# Patient Record
Sex: FEMALE | Race: WHITE | URBAN - METROPOLITAN AREA
[De-identification: names, ages, dates, MRNs, and addresses within clinical notes are randomized per-mention and may not be internally consistent; named-entity substitution may affect disease eponyms.]

---

## 2020-05-13 ENCOUNTER — APPOINTMENT (OUTPATIENT)
Dept: URBAN - METROPOLITAN AREA SURGERY 18 | Age: 75
Setting detail: DERMATOLOGY
End: 2020-05-13

## 2020-05-13 VITALS — SYSTOLIC BLOOD PRESSURE: 160 MMHG | DIASTOLIC BLOOD PRESSURE: 80 MMHG | RESPIRATION RATE: 16 BRPM | HEART RATE: 64 BPM

## 2020-05-13 PROBLEM — C44.729 SQUAMOUS CELL CARCINOMA OF SKIN OF LEFT LOWER LIMB, INCLUDING HIP: Status: ACTIVE | Noted: 2020-05-13

## 2020-05-13 PROCEDURE — 13121 CMPLX RPR S/A/L 2.6-7.5 CM: CPT

## 2020-05-13 PROCEDURE — OTHER REFERRAL CORRESPONDENCE: OTHER

## 2020-05-13 PROCEDURE — OTHER MOHS SURGERY: OTHER

## 2020-05-13 PROCEDURE — 17313 MOHS 1 STAGE T/A/L: CPT

## 2020-05-13 PROCEDURE — OTHER COUNSELING: OTHER

## 2020-05-15 ENCOUNTER — APPOINTMENT (OUTPATIENT)
Dept: URBAN - METROPOLITAN AREA SURGERY 18 | Age: 75
Setting detail: DERMATOLOGY
End: 2020-05-15

## 2020-05-15 VITALS — DIASTOLIC BLOOD PRESSURE: 85 MMHG | HEART RATE: 66 BPM | SYSTOLIC BLOOD PRESSURE: 157 MMHG

## 2020-05-15 PROBLEM — C44.722 SQUAMOUS CELL CARCINOMA OF SKIN OF RIGHT LOWER LIMB, INCLUDING HIP: Status: ACTIVE | Noted: 2020-05-15

## 2020-05-15 PROCEDURE — OTHER MOHS SURGERY WITH PRE-OP FROZEN SECTION BIOPSY: OTHER

## 2020-05-15 PROCEDURE — 17313 MOHS 1 STAGE T/A/L: CPT | Mod: 79

## 2020-05-15 PROCEDURE — 11102 TANGNTL BX SKIN SINGLE LES: CPT | Mod: 59,79

## 2020-05-15 PROCEDURE — 13121 CMPLX RPR S/A/L 2.6-7.5 CM: CPT | Mod: 59,79

## 2020-05-15 PROCEDURE — OTHER MOHS SURGERY WITH PRE-OP FROZEN SECTION SHAVE BIOPSY: OTHER

## 2020-05-15 PROCEDURE — OTHER COUNSELING: OTHER

## 2020-05-15 PROCEDURE — OTHER REFERRAL CORRESPONDENCE: OTHER

## 2020-05-15 PROCEDURE — 88331 PATH CONSLTJ SURG 1 BLK 1SPC: CPT | Mod: 59

## 2020-05-15 NOTE — PROCEDURE: MOHS SURGERY WITH PRE-OP FROZEN SECTION BIOPSY
Applied Partial Purse String (Simple) Text: Given the location of the defect and the characteristics of the surrounding skin a simple purse string closure was deemed most appropriate.  Undermining was performed circumfirentially around the surgical defect.  A purse string suture was then placed and tightened. Wound tension only allowed a partial closure of the circular defect.

## 2020-05-15 NOTE — PROCEDURE: MOHS SURGERY WITH PRE-OP FROZEN SECTION SHAVE BIOPSY
Bilobed Flap Text: Given the location of the defect, inherent tension at the surgical site, and the proximity to free margins a bilobe transposition flap was deemed most appropriate for wound reconstruction. The risks, benefits, and possible outcomes of this procedure were discussed along with the risks, benefits, and possible outcomes of other options for wound repair (including but not limited to: complex closure, other flaps, grafts, and second intention). The patient verbalized understanding and consent to the outlined procedure. The patient verbalized understanding that intraoperative conditions may necessitate a change in the outlined procedure resulting in modification of the original surgical plan. This decision may be made at the discretion of the surgeon, and is due to factors subject to change or that are difficult to predict preoperatively. We also discussed that should the patient heal with a scar they find noticeable/unfavorable additional surgical procedures and/or referral to additional specialists may be required. The patient understands the healing process takes place over many months and that the \"final\" appearance of the scar line often takes a year or longer to achieve.\\nUsing a sterile surgical marker, an appropriate bilobe transposition flap was drawn incorporating the defect and placing the expected incisions within the relaxed skin tension lines where possible. The surgical site and surrounding skin were prepped and draped in sterile fashion.  A single standing cone was removed at the expected pivot point of the transposition flap in the appropriate surgical plane, repositioning as necessary based upon local tension, proximity to free margins/other anatomic structures, and position of the relaxed skin tension lines. The flap (containing two lobes  by 45 degrees, each proportionate in size to the postop defect) was incised and raised in the appropriate surgical plane (just below the level of the nasalis musculature). The resulting defect was undermined widely and bilaterally in the appropriate surgical plane taking care to preserve local arteries, veins, nerves, and other structures of anatomic importance. This created a flap capable of transposing across the defect to close the wound under minimal tension, without distortion of adjacent free margins. Hemostasis was achieved and then the wound was sutured in layered fashion.

## 2020-12-02 ENCOUNTER — APPOINTMENT (OUTPATIENT)
Dept: URBAN - METROPOLITAN AREA SURGERY 18 | Age: 75
Setting detail: DERMATOLOGY
End: 2020-12-03

## 2020-12-02 PROBLEM — C44.729 SQUAMOUS CELL CARCINOMA OF SKIN OF LEFT LOWER LIMB, INCLUDING HIP: Status: ACTIVE | Noted: 2020-12-02

## 2020-12-02 PROCEDURE — OTHER COUNSELING: OTHER

## 2020-12-02 PROCEDURE — 17314 MOHS ADDL STAGE T/A/L: CPT

## 2020-12-02 PROCEDURE — 13121 CMPLX RPR S/A/L 2.6-7.5 CM: CPT

## 2020-12-02 PROCEDURE — OTHER REFERRAL CORRESPONDENCE: OTHER

## 2020-12-02 PROCEDURE — 17313 MOHS 1 STAGE T/A/L: CPT

## 2020-12-02 PROCEDURE — OTHER MOHS SURGERY: OTHER

## 2020-12-02 NOTE — PROCEDURE: MOHS SURGERY
none Referred To Plastics For Closure Text (Leave Blank If You Do Not Want): After obtaining clear surgical margins the patient was sent to plastics for surgical repair. The patient understands they will receive post-surgical care and follow-up from the plastic surgeon's and referring physician's office, and may follow up in our surgical clinic as needed.

## 2020-12-10 ENCOUNTER — APPOINTMENT (OUTPATIENT)
Dept: URBAN - METROPOLITAN AREA SURGERY 18 | Age: 75
Setting detail: DERMATOLOGY
End: 2020-12-10

## 2020-12-10 VITALS — SYSTOLIC BLOOD PRESSURE: 161 MMHG | HEART RATE: 66 BPM | TEMPERATURE: 97.3 F | DIASTOLIC BLOOD PRESSURE: 80 MMHG

## 2020-12-10 DIAGNOSIS — Z48.817 ENCOUNTER FOR SURGICAL AFTERCARE FOLLOWING SURGERY ON THE SKIN AND SUBCUTANEOUS TISSUE: ICD-10-CM

## 2020-12-10 PROBLEM — C44.729 SQUAMOUS CELL CARCINOMA OF SKIN OF LEFT LOWER LIMB, INCLUDING HIP: Status: ACTIVE | Noted: 2020-12-10

## 2020-12-10 PROCEDURE — 17313 MOHS 1 STAGE T/A/L: CPT | Mod: 79

## 2020-12-10 PROCEDURE — OTHER MOHS SURGERY: OTHER

## 2020-12-10 PROCEDURE — 17314 MOHS ADDL STAGE T/A/L: CPT | Mod: 79

## 2020-12-10 PROCEDURE — 13121 CMPLX RPR S/A/L 2.6-7.5 CM: CPT | Mod: 79

## 2020-12-10 PROCEDURE — 13122 CMPLX RPR S/A/L ADDL 5 CM/>: CPT | Mod: 79

## 2020-12-10 PROCEDURE — 99024 POSTOP FOLLOW-UP VISIT: CPT

## 2020-12-10 PROCEDURE — OTHER COUNSELING: OTHER

## 2020-12-10 PROCEDURE — OTHER REFERRAL CORRESPONDENCE: OTHER

## 2020-12-10 PROCEDURE — OTHER POST-OP WOUND CHECK: OTHER

## 2020-12-10 ASSESSMENT — LOCATION DETAILED DESCRIPTION DERM: LOCATION DETAILED: LEFT PROXIMAL PRETIBIAL REGION

## 2020-12-10 ASSESSMENT — LOCATION SIMPLE DESCRIPTION DERM: LOCATION SIMPLE: LEFT PRETIBIAL REGION

## 2020-12-10 ASSESSMENT — LOCATION ZONE DERM: LOCATION ZONE: LEG

## 2020-12-10 NOTE — PROCEDURE: POST-OP WOUND CHECK
Additional Comments: Site evaluated and dressed appropriately. Patient to follow up in clinic in 1 week, sooner if any questions or concerns arise.
Add 74899 Cpt? (Important Note: In 2017 The Use Of 73890 Is Being Tracked By Cms To Determine Future Global Period Reimbursement For Global Periods): yes
Wound Evaluated By: Dr. Héctor Martin
Detail Level: Detailed

## 2020-12-17 ENCOUNTER — APPOINTMENT (OUTPATIENT)
Dept: URBAN - METROPOLITAN AREA SURGERY 18 | Age: 75
Setting detail: DERMATOLOGY
End: 2020-12-17

## 2020-12-17 VITALS — SYSTOLIC BLOOD PRESSURE: 135 MMHG | HEART RATE: 73 BPM | TEMPERATURE: 96.9 F | DIASTOLIC BLOOD PRESSURE: 75 MMHG

## 2020-12-17 DIAGNOSIS — Z48.817 ENCOUNTER FOR SURGICAL AFTERCARE FOLLOWING SURGERY ON THE SKIN AND SUBCUTANEOUS TISSUE: ICD-10-CM

## 2020-12-17 PROBLEM — C44.729 SQUAMOUS CELL CARCINOMA OF SKIN OF LEFT LOWER LIMB, INCLUDING HIP: Status: ACTIVE | Noted: 2020-12-17

## 2020-12-17 PROCEDURE — OTHER PATIENT SPECIFIC COUNSELING: OTHER

## 2020-12-17 PROCEDURE — 17313 MOHS 1 STAGE T/A/L: CPT | Mod: 79

## 2020-12-17 PROCEDURE — 13121 CMPLX RPR S/A/L 2.6-7.5 CM: CPT | Mod: 79

## 2020-12-17 PROCEDURE — 99024 POSTOP FOLLOW-UP VISIT: CPT

## 2020-12-17 PROCEDURE — OTHER MOHS SURGERY: OTHER

## 2020-12-17 PROCEDURE — OTHER REFERRAL CORRESPONDENCE: OTHER

## 2020-12-17 PROCEDURE — OTHER COUNSELING: OTHER

## 2020-12-17 PROCEDURE — OTHER POST-OP WOUND CHECK: OTHER

## 2020-12-17 ASSESSMENT — LOCATION ZONE DERM: LOCATION ZONE: LEG

## 2020-12-17 ASSESSMENT — LOCATION DETAILED DESCRIPTION DERM
LOCATION DETAILED: LEFT PROXIMAL PRETIBIAL REGION
LOCATION DETAILED: LEFT LATERAL DISTAL PRETIBIAL REGION

## 2020-12-17 ASSESSMENT — LOCATION SIMPLE DESCRIPTION DERM: LOCATION SIMPLE: LEFT PRETIBIAL REGION

## 2020-12-17 NOTE — PROCEDURE: MOHS SURGERY
Preop Dx Override (Will Override Above Choices): Well Differentiated Squamous Cell Carcinoma (Keratoacanthoma type)

## 2020-12-17 NOTE — PROCEDURE: POST-OP WOUND CHECK
Detail Level: Detailed
Additional Comments: Appropriately healing surgical incision site. May start to get wet daily. Continue daily dressing changes until fully healed. Follow up PRN
Wound Evaluated By: Dr. Héctor Martin
Add 46661 Cpt? (Important Note: In 2017 The Use Of 85873 Is Being Tracked By Cms To Determine Future Global Period Reimbursement For Global Periods): yes
Additional Comments: Appropriately healing surgical incision site. Minor crusting noted. May start to get wet daily. Continue daily dressing changes until fully healed. Start massage at four week farrah up to ten times daily. Continue use of compression stockings daily. Follow up PRN
Wound Evaluated By: Dr. Héctor Martin

## 2020-12-17 NOTE — PROCEDURE: PATIENT SPECIFIC COUNSELING
Detail Level: Zone
Diagnosis of Keratoacanthoma/Well Differentiated Squamous Cell Carcinoma - KA type reviewed in detail with the patient along with options for management. She understands the natural course of some, but not all, of these lesions may be complete regression and thus observation could be considered. She understands the risks of this approach could include tumor progression (increased size/depth or even metastasis). The patient verbalized understanding and voiced preference for treatment with Mohs surgery.

## 2021-08-30 ENCOUNTER — APPOINTMENT (OUTPATIENT)
Dept: URBAN - METROPOLITAN AREA SURGERY 18 | Age: 76
Setting detail: DERMATOLOGY
End: 2021-08-30

## 2021-08-30 VITALS
DIASTOLIC BLOOD PRESSURE: 77 MMHG | TEMPERATURE: 97.5 F | RESPIRATION RATE: 20 BRPM | HEART RATE: 74 BPM | SYSTOLIC BLOOD PRESSURE: 153 MMHG

## 2021-08-30 PROBLEM — C44.722 SQUAMOUS CELL CARCINOMA OF SKIN OF RIGHT LOWER LIMB, INCLUDING HIP: Status: ACTIVE | Noted: 2021-08-30

## 2021-08-30 PROCEDURE — 13121 CMPLX RPR S/A/L 2.6-7.5 CM: CPT

## 2021-08-30 PROCEDURE — OTHER REFERRAL CORRESPONDENCE: OTHER

## 2021-08-30 PROCEDURE — 17313 MOHS 1 STAGE T/A/L: CPT

## 2021-08-30 PROCEDURE — OTHER MOHS SURGERY: OTHER

## 2021-08-30 PROCEDURE — OTHER COUNSELING: OTHER

## 2021-10-04 ENCOUNTER — APPOINTMENT (OUTPATIENT)
Dept: URBAN - METROPOLITAN AREA SURGERY 18 | Age: 76
Setting detail: DERMATOLOGY
End: 2021-10-07

## 2021-10-04 VITALS — TEMPERATURE: 97.1 F

## 2021-10-04 DIAGNOSIS — Z48.817 ENCOUNTER FOR SURGICAL AFTERCARE FOLLOWING SURGERY ON THE SKIN AND SUBCUTANEOUS TISSUE: ICD-10-CM

## 2021-10-04 PROCEDURE — OTHER POST-OP WOUND CHECK: OTHER

## 2021-10-04 PROCEDURE — 99024 POSTOP FOLLOW-UP VISIT: CPT

## 2021-10-04 ASSESSMENT — LOCATION DETAILED DESCRIPTION DERM: LOCATION DETAILED: RIGHT DISTAL PRETIBIAL REGION

## 2021-10-04 ASSESSMENT — LOCATION ZONE DERM: LOCATION ZONE: LEG

## 2021-10-04 ASSESSMENT — LOCATION SIMPLE DESCRIPTION DERM: LOCATION SIMPLE: RIGHT PRETIBIAL REGION

## 2021-10-04 NOTE — PROCEDURE: POST-OP WOUND CHECK
Additional Comments: Patient presents 4 weeks post Moh’s. Surgical wound is clean and covered. When bandage was removed it appeared superficially dehisced (epidermal predominantly) and moist/focally macerated. No suture material present. Patient was instructed to begin vinegar soaks after bathing and before dressing changes, allowing wound to completely dry prior to rebandaging. If bandaging becomes wet during day may need to change more than once to prevent moisture accumulation/maceration. Educated patient on elevation of feet and compression socks for improved wound healing. Patient will return in 1 week for follow up.
Detail Level: Detailed
Wound Evaluated By: Dr. Héctor Martin
Add 83506 Cpt? (Important Note: In 2017 The Use Of 62200 Is Being Tracked By Cms To Determine Future Global Period Reimbursement For Global Periods): yes

## 2021-10-18 ENCOUNTER — APPOINTMENT (OUTPATIENT)
Dept: URBAN - METROPOLITAN AREA SURGERY 18 | Age: 76
Setting detail: DERMATOLOGY
End: 2021-10-19

## 2021-10-18 DIAGNOSIS — Z48.817 ENCOUNTER FOR SURGICAL AFTERCARE FOLLOWING SURGERY ON THE SKIN AND SUBCUTANEOUS TISSUE: ICD-10-CM

## 2021-10-18 PROCEDURE — 99024 POSTOP FOLLOW-UP VISIT: CPT

## 2021-10-18 PROCEDURE — OTHER POST-OP WOUND CHECK: OTHER

## 2021-10-18 ASSESSMENT — LOCATION ZONE DERM: LOCATION ZONE: LEG

## 2021-10-18 ASSESSMENT — LOCATION DETAILED DESCRIPTION DERM: LOCATION DETAILED: RIGHT DISTAL PRETIBIAL REGION

## 2021-10-18 ASSESSMENT — LOCATION SIMPLE DESCRIPTION DERM: LOCATION SIMPLE: RIGHT PRETIBIAL REGION

## 2021-10-18 NOTE — PROCEDURE: POST-OP WOUND CHECK
Additional Comments: Patient presents 7 weeks post Mohs. The surgical wound has improved in color and consistency. There is visibly less oozing and the wound is firmer and shorter. Overall the wound is healing well without signs/symptoms of complication. That patient will continue to bandage daily until crusting resolves. Patient had no questions or concerns. Patient will follow up as needed, with any new onset symptoms, concerns at surgical site.
Add 88782 Cpt? (Important Note: In 2017 The Use Of 15877 Is Being Tracked By Cms To Determine Future Global Period Reimbursement For Global Periods): yes
Detail Level: Detailed
Wound Evaluated By: Dr. Héctor Martin

## 2023-04-14 NOTE — PROCEDURE: MOHS SURGERY
I reviewed the H&P, I examined the patient, and there are no changes in the patient's condition.   Bcc  Nodular Histology Text: Discrete nests/nodules of malignant basaloid tumor is present within the dermis and/or attached to the epidermis. The tumor demonstrates palisading and is retracted away from a surrounding mucoid stroma.

## 2023-09-21 NOTE — PROCEDURE: MOHS SURGERY
Additional Notes: Patient has had great improvement while using otezla.  Recommend for continued use Detail Level: Generalized Render Risk Assessment In Note?: yes Length To Time In Minutes Device Was In Place: 10

## 2024-02-17 NOTE — PROCEDURE: MOHS SURGERY
Upon entry, pt semi-supine in bed in NAD. /67 HR 75 bpm. Daughter present at bedside. Pt left seated on EOB with all tubes/lines intact, call bell in reach and in NAD. Partial Purse String (Simple) Text: Given the location of the defect and the characteristics of the surrounding skin a simple purse string closure was deemed most appropriate.  Undermining was performed circumfirentially around the surgical defect.  A purse string suture was then placed and tightened. Wound tension only allowed a partial closure of the circular defect.

## 2025-03-12 NOTE — PROCEDURE: MOHS SURGERY
Columba Sinha is a 66 y.o.female who presents with   Chief Complaint   Patient presents with    Cough    Congestion     Symptoms started about 10 days ago     Patient states her sx started approximately 10 days ago. Initially had a discomfort in her chest and she drank something warm seeking some relief. The next day reports throat burning / sore throat. Improvement with Tylenol. Went to work and then started feeling off, like she was coming down with something. Then she subsequently developed cough and body aches. This lasted for approximately 2 days and then she developed rhinorrhea and congestion.  Has noticed that she has had wheezing. She was having a sporadic cough with a lot of phlegm. Green/ yellow in color. Occasionally feels body aches and feels Fatigue. No pain on her sinuses. Unknown if she has had fever. Has been alternating between Tylenol and Ibuprofen and just started Robitussin and this has been still feeling unwell. Denies nausea or diarrhea. Blows her nose and has a lot of phleghm            Review of Systems   All other systems reviewed and are negative.       Past Medical History:   Diagnosis Date    Acquired hypothyroidism        Past Surgical History:   Procedure Laterality Date    HIATAL HERNIA REPAIR      HYSTERECTOMY, VAGINAL      WRIST FRACTURE SURGERY Left        Social History     Socioeconomic History    Marital status:      Spouse name: Not on file    Number of children: Not on file    Years of education: Not on file    Highest education level: Not on file   Occupational History    Not on file   Tobacco Use    Smoking status: Every Day     Current packs/day: 0.25     Average packs/day: 0.3 packs/day for 30.7 years (7.7 ttl pk-yrs)     Types: Cigarettes     Start date: 7/15/1994    Smokeless tobacco: Never   Vaping Use    Vaping status: Never Used   Substance and Sexual Activity    Alcohol use: Never    Drug use: Never    Sexual activity: Yes     Partners: Male   Other  From: Vi Garay  To: Dannielle Mead MD  Sent: 7/29/2021 6:48 PM CDT  Subject: Prescription Question    Hi Doctor Robby Carrizales,   The trial of Adderall ER 20mg seems to be enough to sustain my focus though the work day.  I think this is the appropriate dosag Mid-Level Procedure Text (D): After obtaining clear surgical margins the patient was sent to a mid-level provider for surgical repair.  The patient understands they will receive post-surgical care and follow-up from the mid-level provider.